# Patient Record
Sex: MALE | Race: BLACK OR AFRICAN AMERICAN | NOT HISPANIC OR LATINO | Employment: FULL TIME | URBAN - METROPOLITAN AREA
[De-identification: names, ages, dates, MRNs, and addresses within clinical notes are randomized per-mention and may not be internally consistent; named-entity substitution may affect disease eponyms.]

---

## 2024-10-07 ENCOUNTER — HOSPITAL ENCOUNTER (EMERGENCY)
Facility: HOSPITAL | Age: 24
Discharge: HOME | End: 2024-10-07
Attending: STUDENT IN AN ORGANIZED HEALTH CARE EDUCATION/TRAINING PROGRAM

## 2024-10-07 VITALS
OXYGEN SATURATION: 98 % | SYSTOLIC BLOOD PRESSURE: 122 MMHG | WEIGHT: 140 LBS | BODY MASS INDEX: 23.32 KG/M2 | DIASTOLIC BLOOD PRESSURE: 74 MMHG | RESPIRATION RATE: 18 BRPM | HEART RATE: 84 BPM | TEMPERATURE: 99 F | HEIGHT: 65 IN

## 2024-10-07 DIAGNOSIS — J06.9 VIRAL URI: Primary | ICD-10-CM

## 2024-10-07 LAB
GROUP A STREP, MOLECULAR: NEGATIVE
INFLUENZA A, MOLECULAR: NEGATIVE
INFLUENZA B, MOLECULAR: NEGATIVE
SARS-COV-2 RDRP RESP QL NAA+PROBE: NEGATIVE
SPECIMEN SOURCE: NORMAL

## 2024-10-07 PROCEDURE — U0002 COVID-19 LAB TEST NON-CDC: HCPCS | Performed by: EMERGENCY MEDICINE

## 2024-10-07 PROCEDURE — 87502 INFLUENZA DNA AMP PROBE: CPT | Performed by: EMERGENCY MEDICINE

## 2024-10-07 PROCEDURE — 99282 EMERGENCY DEPT VISIT SF MDM: CPT

## 2024-10-07 PROCEDURE — 87651 STREP A DNA AMP PROBE: CPT | Performed by: EMERGENCY MEDICINE

## 2024-10-07 NOTE — Clinical Note
"Kaykay Lunaberta" Geetha was seen and treated in our emergency department on 10/7/2024.  He may return to work on 10/09/2024.       If you have any questions or concerns, please don't hesitate to call.      Rima Kim, TIGRE"

## 2024-10-08 NOTE — DISCHARGE INSTRUCTIONS
Motrin every 6 hours for fever/body aches, Tylenol every 4 hours  Warm salt water gargles  Follow-up as directed  Return to the emergency department if condition becomes worse for any concerns

## 2024-10-08 NOTE — ED PROVIDER NOTES
Encounter Date: 10/7/2024       History     Chief Complaint   Patient presents with    Sore Throat     Reports sore throat starting today    Nausea     24-year-old well-appearing male presents emergency department with complaint of sore throat, body aches, subjective fever that started earlier this morning.  Patient states that he went to work and symptoms got worse so he came straight to the emergency department.  Patient denies any abdominal pain nausea vomiting or diarrhea.  Denies any known ill contacts.  Patient has not taken any medications for relief of symptoms    The history is provided by the patient.     Review of patient's allergies indicates:  No Known Allergies  History reviewed. No pertinent past medical history.  History reviewed. No pertinent surgical history.  No family history on file.  Social History     Tobacco Use    Smoking status: Unknown     Review of Systems   Constitutional:  Negative for chills and fever.   HENT:  Positive for sore throat.    Respiratory: Negative.     Cardiovascular: Negative.    Gastrointestinal:  Negative for abdominal pain, diarrhea, nausea and vomiting.   Genitourinary: Negative.    Musculoskeletal: Negative.    Skin: Negative.    Hematological: Negative.    Psychiatric/Behavioral: Negative.     All other systems reviewed and are negative.      Physical Exam     Initial Vitals [10/07/24 1757]   BP Pulse Resp Temp SpO2   129/80 92 18 98.9 °F (37.2 °C) 97 %      MAP       --         Physical Exam    Nursing note and vitals reviewed.  Constitutional: He appears well-developed and well-nourished.   HENT:   Head: Normocephalic and atraumatic.   Eyes: Conjunctivae and EOM are normal. Pupils are equal, round, and reactive to light.   Neck:   Normal range of motion.  Cardiovascular:  Normal rate, regular rhythm, normal heart sounds and intact distal pulses.           Pulmonary/Chest: Breath sounds normal. No respiratory distress. He exhibits no tenderness.   Abdominal:  Abdomen is soft. Bowel sounds are normal. He exhibits no distension and no mass. There is no abdominal tenderness. There is no rebound and no guarding.   Musculoskeletal:      Cervical back: Normal range of motion.     Neurological: He is alert and oriented to person, place, and time. He has normal strength. GCS score is 15. GCS eye subscore is 4. GCS verbal subscore is 5. GCS motor subscore is 6.   Skin: Skin is warm. No rash noted.   Psychiatric: He has a normal mood and affect.         ED Course   Procedures  Labs Reviewed   GROUP A STREP, MOLECULAR       Result Value    Group A Strep, Molecular Negative     SARS-COV-2 RNA AMPLIFICATION, QUAL    SARS-CoV-2 RNA, Amplification, Qual Negative     INFLUENZA A AND B ANTIGEN    Influenza A, Molecular Negative      Influenza B, Molecular Negative      Flu A & B Source Nasal swab      Narrative:     Specimen Source->Nasopharyngeal Swab          Imaging Results    None          Medications - No data to display  Medical Decision Making  24-year-old well-appearing male presents emergency department with complaint of sore throat, body aches, subjective fever that started earlier this morning.  Patient states that he went to work and symptoms got worse so he came straight to the emergency department.  Patient denies any abdominal pain nausea vomiting or diarrhea.  Denies any known ill contacts.  Patient has not taken any medications for relief of symptoms    The history is provided by the patient.     Considerations include but not limited to, COVID, influenza, strep, peritonsillar abscess, viral URI    44-year-old male presents emergency department with complaint of sore throat that started earlier this morning.  Patient reports that he felt like he had fever however did not take any medications he states he went to work and started feeling worse therefore he came to the emergency department.  Patient has no obvious erythema, swelling, exudate to the posterior oropharynx, he  has no swelling to the floor of the mouth, no exudate, his uvula is midline.  Patient has no muffled voice changes, he is handling his secretions well.  He has no cervical lymphadenopathy.  Strep testing COVID testing and influenza testing are negative.  Patient was instructed to repeat COVID testing if symptoms persist after 2 days.  He was given return precautions    Amount and/or Complexity of Data Reviewed  Labs: ordered. Decision-making details documented in ED Course.                                      Clinical Impression:  Final diagnoses:  [J06.9] Viral URI (Primary)          ED Disposition Condition    Discharge Stable          ED Prescriptions    None       Follow-up Information       Follow up With Specialties Details Why Contact Info    [facility]  Schedule an appointment as soon as possible for a visit in 2 days  [address] [place] [phone]  [phone]               Rima Kim FNP  10/07/24 1954